# Patient Record
Sex: MALE | Race: WHITE | NOT HISPANIC OR LATINO | Employment: FULL TIME | URBAN - NONMETROPOLITAN AREA
[De-identification: names, ages, dates, MRNs, and addresses within clinical notes are randomized per-mention and may not be internally consistent; named-entity substitution may affect disease eponyms.]

---

## 2024-10-07 ENCOUNTER — TELEPHONE (OUTPATIENT)
Dept: CARDIAC SURGERY | Facility: CLINIC | Age: 39
End: 2024-10-07

## 2024-10-07 PROCEDURE — 87661 TRICHOMONAS VAGINALIS AMPLIF: CPT

## 2024-10-07 PROCEDURE — 87591 N.GONORRHOEAE DNA AMP PROB: CPT

## 2024-10-07 PROCEDURE — G0432 EIA HIV-1/HIV-2 SCREEN: HCPCS

## 2024-10-07 PROCEDURE — 80074 ACUTE HEPATITIS PANEL: CPT

## 2024-10-07 PROCEDURE — 86780 TREPONEMA PALLIDUM: CPT

## 2024-10-07 PROCEDURE — 87491 CHLMYD TRACH DNA AMP PROBE: CPT

## 2024-10-07 NOTE — TELEPHONE ENCOUNTER
Lab called regarding patients labs from 10-7-2024 and states he is reactive to HEP C and states they have never seen a history of that.

## 2024-11-02 ENCOUNTER — HOSPITAL ENCOUNTER (EMERGENCY)
Facility: HOSPITAL | Age: 39
Discharge: HOME | End: 2024-11-02
Admitting: EMERGENCY MEDICINE
Payer: COMMERCIAL

## 2024-11-02 ENCOUNTER — APPOINTMENT (OUTPATIENT)
Dept: ULTRASOUND IMAGING | Facility: HOSPITAL | Age: 39
End: 2024-11-02
Payer: COMMERCIAL

## 2024-11-02 VITALS
DIASTOLIC BLOOD PRESSURE: 66 MMHG | RESPIRATION RATE: 16 BRPM | BODY MASS INDEX: 28.85 KG/M2 | TEMPERATURE: 97.9 F | SYSTOLIC BLOOD PRESSURE: 111 MMHG | HEIGHT: 72 IN | WEIGHT: 213 LBS | HEART RATE: 79 BPM | OXYGEN SATURATION: 99 %

## 2024-11-02 DIAGNOSIS — L02.91 ABSCESS: Primary | ICD-10-CM

## 2024-11-02 LAB
ALBUMIN SERPL-MCNC: 4 G/DL (ref 3.5–5.2)
ALBUMIN/GLOB SERPL: 1.4 G/DL
ALP SERPL-CCNC: 47 U/L (ref 39–117)
ALT SERPL W P-5'-P-CCNC: 33 U/L (ref 1–41)
ANION GAP SERPL CALCULATED.3IONS-SCNC: 10 MMOL/L (ref 5–15)
AST SERPL-CCNC: 28 U/L (ref 1–40)
BASOPHILS # BLD AUTO: 0.03 10*3/MM3 (ref 0–0.2)
BASOPHILS NFR BLD AUTO: 0.2 % (ref 0–1.5)
BILIRUB SERPL-MCNC: 0.5 MG/DL (ref 0–1.2)
BUN SERPL-MCNC: 13 MG/DL (ref 6–20)
BUN/CREAT SERPL: 11.2 (ref 7–25)
CALCIUM SPEC-SCNC: 9.1 MG/DL (ref 8.6–10.5)
CHLORIDE SERPL-SCNC: 103 MMOL/L (ref 98–107)
CO2 SERPL-SCNC: 26 MMOL/L (ref 22–29)
CREAT SERPL-MCNC: 1.16 MG/DL (ref 0.76–1.27)
CRP SERPL-MCNC: 3.28 MG/DL (ref 0–0.5)
D-LACTATE SERPL-SCNC: 1.7 MMOL/L (ref 0.5–2)
DEPRECATED RDW RBC AUTO: 43 FL (ref 37–54)
EGFRCR SERPLBLD CKD-EPI 2021: 82.2 ML/MIN/1.73
EOSINOPHIL # BLD AUTO: 0.03 10*3/MM3 (ref 0–0.4)
EOSINOPHIL NFR BLD AUTO: 0.2 % (ref 0.3–6.2)
ERYTHROCYTE [DISTWIDTH] IN BLOOD BY AUTOMATED COUNT: 13.1 % (ref 12.3–15.4)
GLOBULIN UR ELPH-MCNC: 2.9 GM/DL
GLUCOSE SERPL-MCNC: 102 MG/DL (ref 65–99)
HCT VFR BLD AUTO: 48.7 % (ref 37.5–51)
HGB BLD-MCNC: 16.5 G/DL (ref 13–17.7)
IMM GRANULOCYTES # BLD AUTO: 0.04 10*3/MM3 (ref 0–0.05)
IMM GRANULOCYTES NFR BLD AUTO: 0.3 % (ref 0–0.5)
LYMPHOCYTES # BLD AUTO: 1.49 10*3/MM3 (ref 0.7–3.1)
LYMPHOCYTES NFR BLD AUTO: 11.6 % (ref 19.6–45.3)
MCH RBC QN AUTO: 30.4 PG (ref 26.6–33)
MCHC RBC AUTO-ENTMCNC: 33.9 G/DL (ref 31.5–35.7)
MCV RBC AUTO: 89.9 FL (ref 79–97)
MONOCYTES # BLD AUTO: 1.03 10*3/MM3 (ref 0.1–0.9)
MONOCYTES NFR BLD AUTO: 8 % (ref 5–12)
NEUTROPHILS NFR BLD AUTO: 10.18 10*3/MM3 (ref 1.7–7)
NEUTROPHILS NFR BLD AUTO: 79.7 % (ref 42.7–76)
NRBC BLD AUTO-RTO: 0 /100 WBC (ref 0–0.2)
PLATELET # BLD AUTO: 235 10*3/MM3 (ref 140–450)
PMV BLD AUTO: 9.4 FL (ref 6–12)
POTASSIUM SERPL-SCNC: 4.6 MMOL/L (ref 3.5–5.2)
PROCALCITONIN SERPL-MCNC: 0.04 NG/ML (ref 0–0.25)
PROT SERPL-MCNC: 6.9 G/DL (ref 6–8.5)
RBC # BLD AUTO: 5.42 10*6/MM3 (ref 4.14–5.8)
SODIUM SERPL-SCNC: 139 MMOL/L (ref 136–145)
WBC NRBC COR # BLD AUTO: 12.8 10*3/MM3 (ref 3.4–10.8)

## 2024-11-02 PROCEDURE — 85025 COMPLETE CBC W/AUTO DIFF WBC: CPT | Performed by: NURSE PRACTITIONER

## 2024-11-02 PROCEDURE — 99284 EMERGENCY DEPT VISIT MOD MDM: CPT

## 2024-11-02 PROCEDURE — 86140 C-REACTIVE PROTEIN: CPT | Performed by: NURSE PRACTITIONER

## 2024-11-02 PROCEDURE — 80053 COMPREHEN METABOLIC PANEL: CPT | Performed by: NURSE PRACTITIONER

## 2024-11-02 PROCEDURE — 84145 PROCALCITONIN (PCT): CPT | Performed by: NURSE PRACTITIONER

## 2024-11-02 PROCEDURE — 90471 IMMUNIZATION ADMIN: CPT | Performed by: NURSE PRACTITIONER

## 2024-11-02 PROCEDURE — 90715 TDAP VACCINE 7 YRS/> IM: CPT | Performed by: NURSE PRACTITIONER

## 2024-11-02 PROCEDURE — 25010000002 TETANUS-DIPHTH-ACELL PERTUSSIS 5-2.5-18.5 LF-MCG/0.5 SUSPENSION PREFILLED SYRINGE: Performed by: NURSE PRACTITIONER

## 2024-11-02 PROCEDURE — 83605 ASSAY OF LACTIC ACID: CPT | Performed by: NURSE PRACTITIONER

## 2024-11-02 PROCEDURE — 76999 ECHO EXAMINATION PROCEDURE: CPT

## 2024-11-02 RX ORDER — SODIUM CHLORIDE 0.9 % (FLUSH) 0.9 %
10 SYRINGE (ML) INJECTION AS NEEDED
Status: DISCONTINUED | OUTPATIENT
Start: 2024-11-02 | End: 2024-11-02 | Stop reason: HOSPADM

## 2024-11-02 RX ORDER — CEPHALEXIN 500 MG/1
500 CAPSULE ORAL 3 TIMES DAILY
Qty: 21 CAPSULE | Refills: 0 | Status: SHIPPED | OUTPATIENT
Start: 2024-11-02 | End: 2024-11-07

## 2024-11-02 RX ORDER — SULFAMETHOXAZOLE AND TRIMETHOPRIM 800; 160 MG/1; MG/1
1 TABLET ORAL 2 TIMES DAILY
Qty: 20 TABLET | Refills: 0 | Status: SHIPPED | OUTPATIENT
Start: 2024-11-02 | End: 2024-11-07

## 2024-11-02 RX ADMIN — TETANUS TOXOID, REDUCED DIPHTHERIA TOXOID AND ACELLULAR PERTUSSIS VACCINE, ADSORBED 0.5 ML: 5; 2.5; 8; 8; 2.5 SUSPENSION INTRAMUSCULAR at 10:01

## 2024-11-02 NOTE — DISCHARGE INSTRUCTIONS
Return to ER if symptoms worsen   Warm heat compresses to area three times a day for 15-20 min intervals three times a day  Follow up with primary care provider on Monday   Return to the er before if increased swelling, redness, fever

## 2024-11-02 NOTE — ED PROVIDER NOTES
Subjective   History of Present Illness  Patient is a 39-year-old male presents to the emergency department from urgent care with possible abscess to the left buttocks.  Patient does use IM testosterone and states he gave himself a testosterone injection 3 days ago.  He states the next day that his buttocks became very inflamed indurated and red and painful.  He states this got worse over the last few days.  It is very tender to touch.  And very indurated.  Has had subjective fever as well.  He states the pain has gotten much worse.  No nausea or vomiting.    History provided by:  Patient   used: No        Review of Systems   Constitutional: Negative.    HENT: Negative.     Eyes: Negative.    Respiratory: Negative.     Cardiovascular: Negative.    Gastrointestinal: Negative.    Endocrine: Negative.    Genitourinary: Negative.    Musculoskeletal:         Patient is a 39-year-old male presents to the emergency department from urgent care with possible abscess to the left buttocks.  Patient does use IM testosterone and states he gave himself a testosterone injection 3 days ago.  He states the next day that his buttocks became very inflamed indurated and red and painful.  He states this got worse over the last few days.  It is very tender to touch.  And very indurated.  Has had subjective fever as well.  He states the pain has gotten much worse.  No nausea or vomiting.     Skin: Negative.    Allergic/Immunologic: Negative.    Neurological: Negative.    Hematological: Negative.    Psychiatric/Behavioral: Negative.     All other systems reviewed and are negative.      History reviewed. No pertinent past medical history.    No Known Allergies    Past Surgical History:   Procedure Laterality Date    TONSILLECTOMY         History reviewed. No pertinent family history.    Social History     Socioeconomic History    Marital status: Single   Tobacco Use    Smoking status: Never    Smokeless tobacco: Former  "    Types: Chew   Vaping Use    Vaping status: Never Used   Substance and Sexual Activity    Alcohol use: Never    Drug use: Not Currently    Sexual activity: Defer       Prior to Admission medications    Medication Sig Start Date End Date Taking? Authorizing Provider   ondansetron ODT (ZOFRAN-ODT) 8 MG disintegrating tablet Place 1 tablet on the tongue Every 8 (Eight) Hours As Needed for Nausea or Vomiting. 9/26/24   Aury Juarez APRN   TESTOSTERONE CYPIONATE IM Inject  into the appropriate muscle as directed by prescriber.    Provider, MD Alanna       /78 (BP Location: Left arm, Patient Position: Sitting)   Pulse 77   Temp 97.9 °F (36.6 °C) (Oral)   Resp 20   Ht 182.9 cm (72\")   Wt 96.6 kg (213 lb)   SpO2 98%   BMI 28.89 kg/m²     Objective   Physical Exam  Vitals and nursing note reviewed.   Constitutional:       Appearance: He is well-developed.      Comments: Nontoxic-appearing.  No acute distress.   HENT:      Head: Normocephalic and atraumatic.   Eyes:      Conjunctiva/sclera: Conjunctivae normal.      Pupils: Pupils are equal, round, and reactive to light.   Cardiovascular:      Rate and Rhythm: Normal rate and regular rhythm.      Heart sounds: Normal heart sounds.   Pulmonary:      Effort: Pulmonary effort is normal.      Breath sounds: Normal breath sounds.   Abdominal:      General: Bowel sounds are normal.      Palpations: Abdomen is soft.   Musculoskeletal:      Cervical back: Normal range of motion and neck supple.      Comments: Left buttocks: There is approximately a 5 to 6 cm indurated area noted to the left buttocks.  There is no fluctuant area noted.  Area is mildly erythematous.  Very tender to touch.   Skin:     General: Skin is warm and dry.   Neurological:      Mental Status: He is alert and oriented to person, place, and time.      Deep Tendon Reflexes: Reflexes are normal and symmetric.   Psychiatric:         Behavior: Behavior normal.         Thought Content: Thought " content normal.         Judgment: Judgment normal.         Procedures         Lab Results (last 24 hours)       Procedure Component Value Units Date/Time    Covid-19 + Flu A&B AG, Veritor [[id number]] Collected: 11/02/24 0907    Specimen: Swab Updated: 11/02/24 0908     SARS Antigen Not Detected     Influenza A Antigen MONICA Not Detected     Influenza B Antigen MONICA Not Detected     Internal Control Passed     Lot Number [id number]     Expiration Date 09/04/2025    CBC & Differential [[id number]]  (Abnormal) Collected: 11/02/24 1000    Specimen: Blood Updated: 11/02/24 1009    Narrative:      The following orders were created for panel order CBC & Differential.  Procedure                               Abnormality         Status                     ---------                               -----------         ------                     CBC Auto Differential[[id number]]        Abnormal            Final result                 Please view results for these tests on the individual orders.    Comprehensive Metabolic Panel [[id number]]  (Abnormal) Collected: 11/02/24 1000    Specimen: Blood Updated: 11/02/24 1028     Glucose 102 mg/dL      BUN 13 mg/dL      Creatinine 1.16 mg/dL      Sodium 139 mmol/L      Potassium 4.6 mmol/L      Comment: Slight hemolysis detected by analyzer. Result may be falsely elevated.        Chloride 103 mmol/L      CO2 26.0 mmol/L      Calcium 9.1 mg/dL      Total Protein 6.9 g/dL      Albumin 4.0 g/dL      ALT (SGPT) 33 U/L      AST (SGOT) 28 U/L      Alkaline Phosphatase 47 U/L      Total Bilirubin 0.5 mg/dL      Globulin 2.9 gm/dL      A/G Ratio 1.4 g/dL      BUN/Creatinine Ratio 11.2     Anion Gap 10.0 mmol/L      eGFR 82.2 mL/min/1.73     Narrative:      GFR Normal >60  Chronic Kidney Disease <60  Kidney Failure <15      Lactic Acid, Plasma [[id number]]  (Normal) Collected: 11/02/24 1000    Specimen: Blood Updated: 11/02/24 1025     Lactate 1.7 mmol/L     Procalcitonin [[id number]]  (Normal) Collected:  "11/02/24 1000    Specimen: Blood Updated: 11/02/24 1034     Procalcitonin 0.04 ng/mL     Narrative:      As a Marker for Sepsis (Non-Neonates):    1. <0.5 ng/mL represents a low risk of severe sepsis and/or septic shock.  2. >2 ng/mL represents a high risk of severe sepsis and/or septic shock.    As a Marker for Lower Respiratory Tract Infections that require antibiotic therapy:    PCT on Admission    Antibiotic Therapy       6-12 Hrs later    >0.5                Strongly Recommended  >0.25 - <0.5        Recommended   0.1 - 0.25          Discouraged              Remeasure/reassess PCT  <0.1                Strongly Discouraged     Remeasure/reassess PCT    As 28 day mortality risk marker: \"Change in Procalcitonin Result\" (>80% or <=80%) if Day 0 (or Day 1) and Day 4 values are available. Refer to http://www.liveBooks-pct-calculator.com    Change in PCT <=80%  A decrease of PCT levels below or equal to 80% defines a positive change in PCT test result representing a higher risk for 28-day all-cause mortality of patients diagnosed with severe sepsis for septic shock.    Change in PCT >80%  A decrease of PCT levels of more than 80% defines a negative change in PCT result representing a lower risk for 28-day all-cause mortality of patients diagnosed with severe sepsis or septic shock.       C-reactive Protein [[id number]]  (Abnormal) Collected: 11/02/24 1000    Specimen: Blood Updated: 11/02/24 1028     C-Reactive Protein 3.28 mg/dL     CBC Auto Differential [[id number]]  (Abnormal) Collected: 11/02/24 1000    Specimen: Blood Updated: 11/02/24 1009     WBC 12.80 10*3/mm3      RBC 5.42 10*6/mm3      Hemoglobin 16.5 g/dL      Hematocrit 48.7 %      MCV 89.9 fL      MCH 30.4 pg      MCHC 33.9 g/dL      RDW 13.1 %      RDW-SD 43.0 fl      MPV 9.4 fL      Platelets 235 10*3/mm3      Neutrophil % 79.7 %      Lymphocyte % 11.6 %      Monocyte % 8.0 %      Eosinophil % 0.2 %      Basophil % 0.2 %      Immature Grans % 0.3 %      " Neutrophils, Absolute 10.18 10*3/mm3      Lymphocytes, Absolute 1.49 10*3/mm3      Monocytes, Absolute 1.03 10*3/mm3      Eosinophils, Absolute 0.03 10*3/mm3      Basophils, Absolute 0.03 10*3/mm3      Immature Grans, Absolute 0.04 10*3/mm3      nRBC 0.0 /100 WBC             US Soft Tissue   Final Result   FINDINGS/IMPRESSION:       Targeted soft tissue ultrasound of the left buttock/gluteal soft tissues   over an area of clinical concern was performed. At this site, there is a   1.6 x 0.6 x 0.3 cm fluid collection in the subcutaneous fat.   Differential includes a small abscess versus a small soft tissue   hematoma. There is some surrounding hypervascularity and soft tissue   edema present.       This report was signed and finalized on 11/2/2024 10:28 AM by Dr. Ferdinand Colorado MD.              ED Course  ED Course as of 11/02/24 1100   Sat Nov 02, 2024   0955 Reviewed pt and pt care plan with Dr. Lopez. In agreement with care plan  [CW]   1053 Reviewed pt and pt care plan with Dr. Lopez- will place on Keflex and bactrim. Advised to apply heat to area. Advised to follow up with pcp on Monday if no improvement, return before if symptoms worsen. Pt in agreement with care plan and voices understanding of instructions  [CW]   1056 Targeted soft tissue ultrasound of the left buttock/gluteal soft tissues  over an area of clinical concern was performed. At this site, there is a  1.6 x 0.6 x 0.3 cm fluid collection in the subcutaneous fat.  Differential includes a small abscess versus a small soft tissue  hematoma. There is some surrounding hypervascularity and soft tissue  edema present.      [CW]   1056 Labartory studies Pro-Yayo negative CRP is 3.08 lactate is negative CMP is negative CBC shows white count of 12.8 ultrasound shows a 1.6 x 0.6 x 0.3 cm fluid collection in the subcu fat differential includes a small abscess versus a small soft tissue hematoma.  Reviewed with Dr. Lopez.  Will place patient on  Bactrim and Keflex.  Advised moist heat to the area.  He was updated on his tetanus.  Advised to follow-up with his primary care doctor on Monday.  Will place small portal Tuesday.  Advised to return to emergency department before if increased swelling, redness, fever.  Patient in agreement with care plan and voices understanding of instruction.  Patient will be discharged shortly in stable condition. [CW]      ED Course User Index  [CW] Doreen Saldana APRN        Medical Decision Making  Patient is a 39-year-old male presents to the emergency department from urgent care with possible abscess to the left buttocks.  Patient does use IM testosterone and states he gave himself a testosterone injection 3 days ago.  He states the next day that his buttocks became very inflamed indurated and red and painful.  He states this got worse over the last few days.  It is very tender to touch.  And very indurated.  Has had subjective fever as well.  He states the pain has gotten much worse.  No nausea or vomiting.  Course of treatment in the ED: Nontoxic-appearing.  No acute distress.  Vitals are stable with a blood pressure 125/78, temp 97.9, heart rate 77, respirations 20, O2 sat 98% on room air.  Lungs clear to auscultation.  CV normal sinus rhythm.  Right buttocks there is approximately a 5-1/2 to 6 cm indurated erythematous area noted to the mid right buttocks.  There is no fluctuant abscess noted.  This area is firm and there is no fluctuant abscess.  Erythematous.  Very tender to touch.  Ultrasound of the area, laboratory studies have been ordered. Reviewed pt and pt care plan with Dr. Lopez- also in agreement with care plan and in agreement with care plan     Problems Addressed:  Abscess: complicated acute illness or injury    Amount and/or Complexity of Data Reviewed  Labs: ordered. Decision-making details documented in ED Course.  Radiology: ordered. Decision-making details documented in ED  Course.    Risk  Prescription drug management.         Final diagnoses:   Abscess          Doreen Saldana, APRN  11/02/24 1100

## 2024-11-02 NOTE — Clinical Note
[facility] EMERGENCY DEPARTMENT  [address]  [facility] [address]  Phone: [phone]    Jose Ortiz was seen and treated in our emergency department on 11/2/2024.  He may return to work on 11/05/2024.         Thank you for choosing [facility].    Doreen Saldana APRN

## 2024-11-07 ENCOUNTER — OFFICE VISIT (OUTPATIENT)
Dept: FAMILY MEDICINE CLINIC | Facility: CLINIC | Age: 39
End: 2024-11-07
Payer: COMMERCIAL

## 2024-11-07 ENCOUNTER — LAB (OUTPATIENT)
Dept: LAB | Facility: HOSPITAL | Age: 39
End: 2024-11-07
Payer: COMMERCIAL

## 2024-11-07 ENCOUNTER — HOSPITAL ENCOUNTER (OUTPATIENT)
Dept: ULTRASOUND IMAGING | Facility: HOSPITAL | Age: 39
Discharge: HOME | End: 2024-11-07
Payer: COMMERCIAL

## 2024-11-07 VITALS
TEMPERATURE: 98.3 F | DIASTOLIC BLOOD PRESSURE: 94 MMHG | RESPIRATION RATE: 15 BRPM | HEIGHT: 72 IN | BODY MASS INDEX: 29.66 KG/M2 | HEART RATE: 71 BPM | OXYGEN SATURATION: 97 % | SYSTOLIC BLOOD PRESSURE: 112 MMHG | WEIGHT: 219 LBS

## 2024-11-07 DIAGNOSIS — R53.83 OTHER FATIGUE: ICD-10-CM

## 2024-11-07 DIAGNOSIS — Z00.00 ENCOUNTER FOR MEDICAL EXAMINATION TO ESTABLISH CARE: Primary | ICD-10-CM

## 2024-11-07 DIAGNOSIS — Z12.5 SCREENING FOR PROSTATE CANCER: ICD-10-CM

## 2024-11-07 DIAGNOSIS — Z87.438 HISTORY OF TESTICULAR MASS: ICD-10-CM

## 2024-11-07 DIAGNOSIS — Z86.19 HISTORY OF HEPATITIS C: ICD-10-CM

## 2024-11-07 DIAGNOSIS — N50.3 EPIDIDYMAL CYST: Primary | ICD-10-CM

## 2024-11-07 DIAGNOSIS — M43.10 RETROLISTHESIS OF VERTEBRAE: ICD-10-CM

## 2024-11-07 LAB
ALBUMIN UR-MCNC: <1.2 MG/DL
CHOLEST SERPL-MCNC: 127 MG/DL (ref 130–200)
CREAT UR-MCNC: 109.9 MG/DL
HBA1C MFR BLD: 5.4 % (ref 4.8–5.9)
HDLC SERPL-MCNC: 24 MG/DL
LDLC SERPL CALC-MCNC: 83 MG/DL (ref 0–99)
LDLC/HDLC SERPL: 3.43 {RATIO}
MICROALBUMIN/CREAT UR: NORMAL MG/G{CREAT}
PSA SERPL-MCNC: 1.11 NG/ML (ref 0–4)
TRIGL SERPL-MCNC: 104 MG/DL (ref 0–149)
TSH SERPL DL<=0.05 MIU/L-ACNC: 1.62 UIU/ML (ref 0.27–4.2)
VLDLC SERPL-MCNC: 20 MG/DL (ref 5–40)

## 2024-11-07 PROCEDURE — 80061 LIPID PANEL: CPT

## 2024-11-07 PROCEDURE — 36415 COLL VENOUS BLD VENIPUNCTURE: CPT

## 2024-11-07 PROCEDURE — 99214 OFFICE O/P EST MOD 30 MIN: CPT | Performed by: STUDENT IN AN ORGANIZED HEALTH CARE EDUCATION/TRAINING PROGRAM

## 2024-11-07 PROCEDURE — 84443 ASSAY THYROID STIM HORMONE: CPT

## 2024-11-07 PROCEDURE — 87522 HEPATITIS C REVRS TRNSCRPJ: CPT

## 2024-11-07 PROCEDURE — 84402 ASSAY OF FREE TESTOSTERONE: CPT

## 2024-11-07 PROCEDURE — 83036 HEMOGLOBIN GLYCOSYLATED A1C: CPT

## 2024-11-07 PROCEDURE — 82570 ASSAY OF URINE CREATININE: CPT

## 2024-11-07 PROCEDURE — 84403 ASSAY OF TOTAL TESTOSTERONE: CPT

## 2024-11-07 PROCEDURE — 76870 US EXAM SCROTUM: CPT

## 2024-11-07 PROCEDURE — 82043 UR ALBUMIN QUANTITATIVE: CPT

## 2024-11-07 PROCEDURE — G0103 PSA SCREENING: HCPCS

## 2024-11-07 RX ORDER — CYCLOBENZAPRINE HCL 10 MG
10 TABLET ORAL 3 TIMES DAILY PRN
Qty: 90 TABLET | Refills: 2 | Status: SHIPPED | OUTPATIENT
Start: 2024-11-07

## 2024-11-07 RX ORDER — LIDOCAINE 50 MG/G
1 PATCH TOPICAL EVERY 24 HOURS
Qty: 30 PATCH | Refills: 4 | Status: SHIPPED | OUTPATIENT
Start: 2024-11-07
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
## 2024-11-10 LAB
TESTOST FREE SERPL-MCNC: >50 PG/ML (ref 8.7–25.1)
TESTOST SERPL-MCNC: >1500 NG/DL (ref 264–916)

## 2024-11-11 LAB
HCV RNA SERPL NAA+PROBE-ACNC: NORMAL IU/ML
REF LAB TEST REF RANGE: NORMAL

## 2025-01-14 ENCOUNTER — OFFICE VISIT (OUTPATIENT)
Dept: FAMILY MEDICINE CLINIC | Facility: CLINIC | Age: 40
End: 2025-01-14
Payer: COMMERCIAL

## 2025-01-14 VITALS
HEART RATE: 98 BPM | DIASTOLIC BLOOD PRESSURE: 106 MMHG | TEMPERATURE: 97.9 F | RESPIRATION RATE: 14 BRPM | SYSTOLIC BLOOD PRESSURE: 142 MMHG | OXYGEN SATURATION: 97 % | BODY MASS INDEX: 30.34 KG/M2 | HEIGHT: 72 IN | WEIGHT: 224 LBS

## 2025-01-14 DIAGNOSIS — B00.1 RECURRENT COLD SORES: ICD-10-CM

## 2025-01-14 DIAGNOSIS — T38.7X5S ADVERSE EFFECT OF TESTOSTERONE, SEQUELA: ICD-10-CM

## 2025-01-14 DIAGNOSIS — R03.0 ELEVATED BLOOD PRESSURE READING: Primary | ICD-10-CM

## 2025-01-14 DIAGNOSIS — N50.3 EPIDIDYMAL CYST: ICD-10-CM

## 2025-01-14 PROCEDURE — 99214 OFFICE O/P EST MOD 30 MIN: CPT | Performed by: STUDENT IN AN ORGANIZED HEALTH CARE EDUCATION/TRAINING PROGRAM

## 2025-01-14 RX ORDER — VALACYCLOVIR HYDROCHLORIDE 1 G/1
1000 TABLET, FILM COATED ORAL 3 TIMES DAILY
Qty: 30 TABLET | Refills: 0 | Status: SHIPPED | OUTPATIENT
Start: 2025-01-14 | End: 2025-01-24

## 2025-01-20 PROBLEM — B00.1 RECURRENT COLD SORES: Status: ACTIVE | Noted: 2025-01-20

## 2025-01-20 NOTE — PROGRESS NOTES
Chief Complaint  lab results    Baljeet Ortiz presents to [facility] PRIMARY CARE    HPI    History of Present Illness  The patient presents for evaluation of elevated blood pressure, fever blisters, and testicular cyst.    He has been experiencing persistent elevated blood pressure, which he attributes to significant stress levels. He does not currently possess a home blood pressure monitor. He reports an episode of tachycardia while at rest, which was accompanied by transient visual disturbances and a sensation akin to a mild stroke. These symptoms resolved spontaneously. He also experiences occasional lightheadedness, particularly during periods of heightened stress.    He has a history of fever blisters, which have been effectively managed with valacyclovir previously. He recently experienced two consecutive episodes of fever blisters, which he believes were triggered by exposure to a tanning bed. He also identifies acidic foods and stress as potential triggers. He expresses interest in resuming valacyclovir treatment to prevent future outbreaks, given his frequent use of tanning beds.    He has a testicular cyst that causes sharp pain upon contact. However, it does not interfere with his daily activities. He is considering surgical removal of the cyst due to its nuisance value.    He has been on a self-prescribed testosterone regimen, currently at a reduced dose of 250 to 300 mg per week, down from his usual 900 mg per week. Labs from 11/7 show testosterone level >1500    Supplemental Information  He has been consuming antacid tablets recently.    MEDICATIONS  - Testosterone  - Valacyclovir    Past Medical History:   Diagnosis Date    Low testosterone      Past Surgical History:   Procedure Laterality Date    TONSILLECTOMY       Social History     Socioeconomic History    Marital status: Single   Tobacco Use    Smoking status: Never    Smokeless tobacco: Former      "Types: Chew   Vaping Use    Vaping status: Never Used   Substance and Sexual Activity    Alcohol use: Never    Drug use: Not Currently    Sexual activity: Yes     Partners: Female       Objective   Vital Signs:  BP (!) 142/106   Pulse 98   Temp 97.9 °F (36.6 °C) (Temporal)   Resp 14   Ht 182.9 cm (72.01\")   Wt 102 kg (224 lb)   SpO2 97%   BMI 30.37 kg/m²   Estimated body mass index is 30.37 kg/m² as calculated from the following:    Height as of this encounter: 182.9 cm (72.01\").    Weight as of this encounter: 102 kg (224 lb).              Physical Exam  Vitals reviewed.   Constitutional:       Appearance: Normal appearance.   HENT:      Head: Normocephalic.      Nose: Nose normal.      Mouth/Throat:      Mouth: Mucous membranes are moist.   Eyes:      Extraocular Movements: Extraocular movements intact.   Cardiovascular:      Rate and Rhythm: Normal rate and regular rhythm.      Heart sounds: Normal heart sounds.   Pulmonary:      Effort: Pulmonary effort is normal.      Breath sounds: Normal breath sounds.   Musculoskeletal:         General: Normal range of motion.      Cervical back: Normal range of motion.   Skin:     General: Skin is warm and dry.   Neurological:      General: No focal deficit present.      Mental Status: He is alert and oriented to person, place, and time.   Psychiatric:         Mood and Affect: Mood normal.         Behavior: Behavior normal.        Physical Exam  Vital Signs  Blood pressure is 142/106.    Result Review :        Results  Laboratory Studies  Testosterone level greater than 1500. Hepatitis not detected. PSA is normal. LDL is normal, HDL is a little low. A1c is 5.4. Thyroid is normal. No protein leaking of the urine.             Assessment and Plan   Diagnoses and all orders for this visit:    1. Elevated blood pressure reading (Primary)  -His elevated blood pressure may be attributed to his testosterone therapy, stress levels, or a combination of both. The cessation of " testosterone therapy will help determine if it is a contributing factor. He has been advised to discontinue his testosterone therapy for a period of 3 to 4 weeks and monitor his blood pressure during this time. He has been counseled on the importance of maintaining a low-sodium diet. If his blood pressure remains elevated after the cessation of testosterone therapy, antihypertensive medication will be considered.    2. Recurrent cold sores  -He has been advised to either commence daily valacyclovir therapy or initiate treatment at the onset of symptoms and continue for the full course. A prescription for valacyclovir 1 g, to be taken three times daily for 10 days, has been provided. If episodic treatment is not effective, suppressive therapy may be considered.  -     valACYclovir (Valtrex) 1000 MG tablet; Take 1 tablet by mouth 3 (Three) Times a Day for 10 days.  Dispense: 30 tablet; Refill: 0    3. Epididymal cyst  -The cyst is approximately 7 mm in size, likely benign, and does not exhibit characteristics suggestive of malignancy. He has been informed that surgical intervention is not necessary unless the cyst becomes symptomatic. If symptoms worsen, a referral to urology will be considered.    4. Adverse effect of testosterone, sequela  -He is on a self-prescribed testosterone regimen, currently at a reduced dose of 250 to 300 mg per week, down from his usual 900 mg per week. His testosterone level was significantly high at greater than 1500 in November. He has been advised to discontinue his testosterone therapy for a period of 3 to 4 weeks to assess its impact on his blood pressure. If his blood pressure normalizes, testosterone therapy may be resumed at a lower dose with regular monitoring.           EMR Dragon/Transcription disclaimer:   Much of this encounter note is an electronic transcription/translation of spoken language to printed text. The electronic translation of spoken language may permit erroneous,  or at times, nonsensical words or phrases to be inadvertently transcribed; although attempts have made to review the note for such errors, some may still exist. Please excuse any unrecognized transcription errors and contact us if the air is unintelligible or needs documented correction. Also, portions of this note have been copied forward, however, changed to reflect the most current clinical status of this patient.  Follow Up   Return in about 4 weeks (around 2/11/2025).    Patient or patient representative verbalized consent for the use of Ambient Listening during the visit with  Mert Ochoa MD for chart documentation. 1/20/2025  12:27 CST    Patient was given instructions and counseling regarding his condition or for health maintenance advice. Please see specific information pulled into the AVS if appropriate.

## 2025-03-05 ENCOUNTER — OFFICE VISIT (OUTPATIENT)
Dept: FAMILY MEDICINE CLINIC | Facility: CLINIC | Age: 40
End: 2025-03-05
Payer: COMMERCIAL

## 2025-03-05 VITALS
SYSTOLIC BLOOD PRESSURE: 112 MMHG | TEMPERATURE: 98 F | RESPIRATION RATE: 14 BRPM | WEIGHT: 121 LBS | DIASTOLIC BLOOD PRESSURE: 70 MMHG | HEIGHT: 72 IN | OXYGEN SATURATION: 97 % | HEART RATE: 76 BPM | BODY MASS INDEX: 16.39 KG/M2

## 2025-03-05 DIAGNOSIS — F41.9 ANXIETY AND DEPRESSION: ICD-10-CM

## 2025-03-05 DIAGNOSIS — R44.0 AUDITORY HALLUCINATIONS: Primary | ICD-10-CM

## 2025-03-05 DIAGNOSIS — F32.A ANXIETY AND DEPRESSION: ICD-10-CM

## 2025-03-05 DIAGNOSIS — R03.0 ELEVATED BLOOD PRESSURE READING: ICD-10-CM

## 2025-03-05 PROCEDURE — 99215 OFFICE O/P EST HI 40 MIN: CPT | Performed by: STUDENT IN AN ORGANIZED HEALTH CARE EDUCATION/TRAINING PROGRAM

## 2025-03-05 RX ORDER — HYDROXYZINE HYDROCHLORIDE 25 MG/1
25 TABLET, FILM COATED ORAL 3 TIMES DAILY PRN
Qty: 90 TABLET | Refills: 2 | Status: SHIPPED | OUTPATIENT
Start: 2025-03-05

## 2025-03-05 NOTE — PROGRESS NOTES
Chief Complaint  hearing voices, Anxiety, and Depression    Subjective        Jose Ortiz presents to [facility]    HPI    History of Present Illness  The patient presents for evaluation of auditory hallucinations, anxiety, depression, and hypertension.    He has been experiencing auditory hallucinations since 2017, which have significantly contributed to his severe anxiety and depression. These hallucinations are intermittent, characterized by a humming or ringing sound that alternates between his ears. He reports engaging in full conversations with these voices, which he perceives as different individuals. These voices often encourage him to engage in self-destructive behaviors such as suicide or drug use. He also experiences intrusive thoughts and images, including hanging himself, cutting his wrists, or shooting himself. He describes these images as being projected onto a screen in his mind, with the voices providing a detailed narration. He has a history of methamphetamine use, starting at age 16, but has been clean for over 3 years. He does not believe his current symptoms are related to his past drug use. He served in the  from 2003 to 2007 but was discharged due to drug use. He suspects that his family and the  may be involved in his current situation. He has a scheduled appointment with a psychiatrist in [place] on Friday. He is currently employed as a  at [employer]. He underwent a tonsillectomy and had ear tubes inserted at the age of 10.    He has been experiencing severe anxiety and depression, which he attributes to the stress of his auditory hallucinations. He has a history of suicidal ideation and a suicide attempt approximately 5 days ago. He is currently in recovery from substance abuse and has been sober for over 3 years. He reports significant stress at work and home, which he believes is exacerbated by his living situation.  "He shares a residence with two coworkers, but the environment is not conducive to his mental health. He often isolates himself in his room to avoid social interaction. He is currently working and saving money to pay off his taxes and other financial obligations. He maintains a routine of going to the gym after work and retiring to bed early. He is seeking medication to help manage his anxiety and prevent further self-harm. He has previously found Vistaril effective for his anxiety.    He believes his persistent hypertension is a result of the stress and anxiety induced by these hallucinations.    He had stopped taking testosterone but was having some problems with erectile dysfunction, so he started back up on a low dose of testosterone at 250 mg a week.    SOCIAL HISTORY  He has been clean and sober for over 3 years. He lives with his coworkers. He served in the  from 2003 to 2007 but was discharged due to drug use. He is currently employed as a  at [employer].    MEDICATIONS  - Current: Testosterone  - Past: Vistaril          Past Medical History:   Diagnosis Date    Low testosterone      Past Surgical History:   Procedure Laterality Date    TONSILLECTOMY       Social History     Socioeconomic History    Marital status: Single   Tobacco Use    Smoking status: Never    Smokeless tobacco: Former     Types: Chew   Vaping Use    Vaping status: Never Used   Substance and Sexual Activity    Alcohol use: Never    Drug use: Not Currently    Sexual activity: Yes     Partners: Female       Objective   Vital Signs:  /70   Pulse 76   Temp 98 °F (36.7 °C) (Temporal)   Resp 14   Ht 182.9 cm (72.01\")   Wt 54.9 kg (121 lb)   SpO2 97%   BMI 16.41 kg/m²   Estimated body mass index is 16.41 kg/m² as calculated from the following:    Height as of this encounter: 182.9 cm (72.01\").    Weight as of this encounter: 54.9 kg (121 lb).       BMI is below normal parameters (malnutrition). Recommendations: none " (medical contraindication)      Physical Exam  Vitals reviewed.   Constitutional:       Appearance: Normal appearance.   HENT:      Head: Normocephalic.      Nose: Nose normal.      Mouth/Throat:      Mouth: Mucous membranes are moist.   Eyes:      Extraocular Movements: Extraocular movements intact.   Cardiovascular:      Rate and Rhythm: Normal rate and regular rhythm.      Heart sounds: Normal heart sounds.   Pulmonary:      Effort: Pulmonary effort is normal.      Breath sounds: Normal breath sounds.   Musculoskeletal:         General: Normal range of motion.      Cervical back: Normal range of motion.   Skin:     General: Skin is warm and dry.   Neurological:      General: No focal deficit present.      Mental Status: He is alert and oriented to person, place, and time.   Psychiatric:         Mood and Affect: Mood normal.         Behavior: Behavior normal.        Physical Exam      Result Review :        Results               Assessment and Plan   Diagnoses and all orders for this visit:    1. Auditory hallucinations (Primary)  -The patient reports hearing voices since 2017, which have caused severe anxiety and depression. He describes the voices as intrusive and negative, sometimes urging self-harm and drug use. The etiology could be multifactorial, potentially linked to schizophrenia, bipolar disorder, or residual effects of past substance abuse. Samples of Caplyta will be provided, starting with a lower dose 10.5 mg daily for one week, then gradually increasing to 21 mg then 42 mg over 4 weeks. He is advised to take the medication in the evening to help with sleep and reduce anxiety. A referral to a psychiatrist for further evaluation and management is recommended. He is also encouraged to seek help from [facility] for additional support and treatment planning.    2. Anxiety and depression  -The patient's anxiety is exacerbated by his auditory hallucinations and stressful living environment. Caplyta is  expected to help reduce anxiety levels. Additionally, a prescription for Vistaril will be sent to [facility], which he can take as needed for anxiety relief. He is advised to continue his routine of going to the gym and maintaining a structured daily schedule to help manage anxiety.The patient reports severe depression, including suicidal thoughts and a past suicide attempt. Caplyta is expected to help improve mood and reduce depressive symptoms. He is advised to seek immediate help if suicidal thoughts intensify. A follow-up with a psychiatrist is recommended for ongoing management of his depression.    -     hydrOXYzine (ATARAX) 25 MG tablet; Take 1 tablet by mouth 3 (Three) Times a Day As Needed for Itching.  Dispense: 90 tablet; Refill: 2    3. Elevated blood pressure reading  -The patient's blood pressure has improved, likely due to reduced anxiety and cessation of high-dose testosterone. He is advised to discontinue testosterone therapy temporarily to focus on mental health stabilization. Blood pressure will be monitored, and testosterone levels will be rechecked once mental health is stabilize    Follow-up 4 weeks     I spent 44 minutes caring for Jose on this date of service. This time includes time spent by me in the following activities:preparing for the visit, reviewing tests, obtaining and/or reviewing a separately obtained history, performing a medically appropriate examination and/or evaluation , counseling and educating the patient/family/caregiver, ordering medications, tests, or procedures, referring and communicating with other health care professionals , documenting information in the medical record, independently interpreting results and communicating that information with the patient/family/caregiver, and care coordination  EMR Dragon/Transcription disclaimer:   Part of this note may be an electronic transcription/translation of spoken language to printed text using the Dragon Dictation  System     Follow Up   No follow-ups on file.    Patient or patient representative verbalized consent for the use of Ambient Listening during the visit with  Mret Ochoa MD for chart documentation. 3/10/2025  13:56 CDT    Patient was given instructions and counseling regarding his condition or for health maintenance advice. Please see specific information pulled into the AVS if appropriate.

## 2025-04-04 ENCOUNTER — OFFICE VISIT (OUTPATIENT)
Dept: FAMILY MEDICINE CLINIC | Facility: CLINIC | Age: 40
End: 2025-04-04
Payer: COMMERCIAL

## 2025-04-04 VITALS
HEIGHT: 72 IN | TEMPERATURE: 98 F | DIASTOLIC BLOOD PRESSURE: 80 MMHG | HEART RATE: 86 BPM | OXYGEN SATURATION: 99 % | SYSTOLIC BLOOD PRESSURE: 128 MMHG | RESPIRATION RATE: 12 BRPM | WEIGHT: 124 LBS | BODY MASS INDEX: 16.8 KG/M2

## 2025-04-04 DIAGNOSIS — F41.9 ANXIETY AND DEPRESSION: ICD-10-CM

## 2025-04-04 DIAGNOSIS — R44.0 AUDITORY HALLUCINATIONS: Primary | ICD-10-CM

## 2025-04-04 DIAGNOSIS — F32.A ANXIETY AND DEPRESSION: ICD-10-CM

## 2025-04-04 DIAGNOSIS — M43.10 RETROLISTHESIS OF VERTEBRAE: ICD-10-CM

## 2025-04-04 RX ORDER — LUMATEPERONE 42 MG/1
1 CAPSULE ORAL DAILY
Qty: 90 CAPSULE | Refills: 3 | Status: SHIPPED | OUTPATIENT
Start: 2025-04-04

## 2025-04-04 RX ORDER — BUSPIRONE HYDROCHLORIDE 7.5 MG/1
7.5 TABLET ORAL 2 TIMES DAILY
Qty: 60 TABLET | Refills: 2 | Status: SHIPPED | OUTPATIENT
Start: 2025-04-04

## 2025-04-04 RX ORDER — GABAPENTIN 800 MG/1
800 TABLET ORAL 3 TIMES DAILY PRN
Qty: 90 TABLET | Refills: 2 | Status: SHIPPED | OUTPATIENT
Start: 2025-04-04

## 2025-04-04 NOTE — PROGRESS NOTES
Chief Complaint  medication check (Cymbalta is working well )    Subjective        Jose Ortiz presents to [facility]    HPI    History of Present Illness  The patient presents for evaluation of anxiety and back pain.    He reports a positive response to Caplyta, which he has been utilizing as a sample medication.  Over 4 weeks he worked up to 42 mg dose and tolerating well.  However, he has exhausted his supply and is seeking a prescription. He notes that the medication effectively induces sleep when taken around 9:00 PM or 10:00 PM, and its effects persist into the morning hours. Initially, he did not perceive the medication's efficacy, but after a loading period of 3 to 4 weeks, he observed significant improvement. He continues to experience auditory hallucinations but reports an enhanced ability to manage them. He has also sought counseling services for.    He experiences severe panic attacks during the day, which are not adequately managed by Vistaril. He is interested in exploring alternative treatment options. His anxiety is exacerbated by stressful situations, leading to heightened nervousness and jitteriness.    He has a history of back injury and has been scheduled for pain management on 04/23/2025. He was previously prescribed gabapentin for sciatic nerve pain and back pain, which he found beneficial. The dosage was initially low and gradually increased to 800 mg, administered 2 to 3 times daily.    MEDICATIONS  - Current: Caplyta  - Current: Vistaril  - Past: gabapentin    Past Medical History:   Diagnosis Date    Low testosterone      Past Surgical History:   Procedure Laterality Date    TONSILLECTOMY       Social History     Socioeconomic History    Marital status: Single   Tobacco Use    Smoking status: Never    Smokeless tobacco: Former     Types: Chew   Vaping Use    Vaping status: Never Used   Substance and Sexual Activity    Alcohol use: Never    Drug  "use: Not Currently    Sexual activity: Yes     Partners: Female       Objective   Vital Signs:  /80   Pulse 86   Temp 98 °F (36.7 °C) (Temporal)   Resp 12   Ht 182.9 cm (72.01\")   Wt 56.2 kg (124 lb)   SpO2 99%   BMI 16.81 kg/m²   Estimated body mass index is 16.81 kg/m² as calculated from the following:    Height as of this encounter: 182.9 cm (72.01\").    Weight as of this encounter: 56.2 kg (124 lb).              Physical Exam   Physical Exam  Vital Signs  Blood pressure is normal.    Result Review :        Results               Assessment and Plan   Diagnoses and all orders for this visit:    1. Auditory hallucinations (Primary)  -Improved. Continue counseling with [facility]. A prescription for Caplyta 42 mg has been issued, and he will continue with this medication. He is instructed to inform us if there are any issues with insurance coverage for Caplyta.  -     Lumateperone Tosylate (Caplyta) 42 MG capsule; Take 1 capsule by mouth Daily.  Dispense: 90 capsule; Refill: 3    2. Anxiety and depression  -He reports that Vistaril is not effectively managing his daytime panic attacks. BuSpar (buspirone) 10 mg twice daily has been recommended, with the option to increase to three times daily if needed. He is advised to take it 30-45 minutes before anticipated anxiety-inducing situations if taking as-needed. Can increase dose if tolerated. Propranolol was discussed as an alternative option for anxiety management.  -     busPIRone (BUSPAR) 7.5 MG tablet; Take 1 tablet by mouth 2 (Two) Times a Day.  Dispense: 60 tablet; Refill: 2    3. Retrolisthesis of vertebrae  -     gabapentin (Neurontin) 800 MG tablet; Take 1 tablet by mouth 3 (Three) Times a Day As Needed (sciatic pain).  Dispense: 90 tablet; Refill: 2      FU 3 mo or sooner if needed       EMR Dragon/Transcription disclaimer:   Part of this note may be an electronic transcription/translation of spoken language to printed text using the Dragon Dictation " System     Follow Up   Return in about 3 months (around 7/4/2025).    Patient or patient representative verbalized consent for the use of Ambient Listening during the visit with  Mert Ochoa MD for chart documentation. 4/4/2025  09:29 CDT    Patient was given instructions and counseling regarding his condition or for health maintenance advice. Please see specific information pulled into the AVS if appropriate.

## 2025-04-25 ENCOUNTER — TRANSCRIBE ORDERS (OUTPATIENT)
Dept: ADMINISTRATIVE | Facility: HOSPITAL | Age: 40
End: 2025-04-25
Payer: COMMERCIAL

## 2025-04-25 DIAGNOSIS — M54.16 LUMBAR RADICULOPATHY: Primary | ICD-10-CM

## 2025-04-26 ENCOUNTER — TRANSCRIBE ORDERS (OUTPATIENT)
Dept: ADMINISTRATIVE | Facility: HOSPITAL | Age: 40
End: 2025-04-26
Payer: COMMERCIAL

## 2025-04-26 DIAGNOSIS — M43.06 SPONDYLOLYSIS, LUMBAR REGION: ICD-10-CM

## 2025-04-26 DIAGNOSIS — M47.816 SPONDYLOSIS WITHOUT MYELOPATHY OR RADICULOPATHY, LUMBAR REGION: ICD-10-CM

## 2025-04-26 DIAGNOSIS — M54.50 LOW BACK PAIN, UNSPECIFIED BACK PAIN LATERALITY, UNSPECIFIED CHRONICITY, UNSPECIFIED WHETHER SCIATICA PRESENT: ICD-10-CM

## 2025-04-26 DIAGNOSIS — M54.17 RADICULOPATHY, LUMBOSACRAL REGION: ICD-10-CM

## 2025-04-26 DIAGNOSIS — M54.16 RADICULOPATHY, LUMBAR REGION: Primary | ICD-10-CM

## 2025-04-26 DIAGNOSIS — M99.23 SUBLUXATION STENOSIS OF NEURAL CANAL OF LUMBAR REGION: ICD-10-CM

## 2025-04-30 PROBLEM — J02.9 PHARYNGITIS: Status: ACTIVE | Noted: 2025-04-30

## 2025-04-30 PROBLEM — R11.0 NAUSEA: Status: ACTIVE | Noted: 2025-04-30

## 2025-04-30 PROBLEM — R05.8 PRODUCTIVE COUGH: Status: ACTIVE | Noted: 2025-04-30

## 2025-04-30 PROBLEM — J01.40 ACUTE NON-RECURRENT PANSINUSITIS: Status: ACTIVE | Noted: 2025-04-30

## 2025-04-30 PROBLEM — R50.9 FEVER IN ADULT: Status: ACTIVE | Noted: 2025-04-30

## 2025-05-06 ENCOUNTER — HOSPITAL ENCOUNTER (OUTPATIENT)
Dept: GENERAL RADIOLOGY | Facility: HOSPITAL | Age: 40
Discharge: HOME | End: 2025-05-06
Payer: COMMERCIAL

## 2025-05-06 ENCOUNTER — HOSPITAL ENCOUNTER (OUTPATIENT)
Dept: MRI IMAGING | Facility: HOSPITAL | Age: 40
Discharge: HOME | End: 2025-05-06
Payer: COMMERCIAL

## 2025-05-06 DIAGNOSIS — M47.816 SPONDYLOSIS WITHOUT MYELOPATHY OR RADICULOPATHY, LUMBAR REGION: ICD-10-CM

## 2025-05-06 DIAGNOSIS — M54.16 LUMBAR RADICULOPATHY: ICD-10-CM

## 2025-05-06 DIAGNOSIS — M54.16 RADICULOPATHY, LUMBAR REGION: ICD-10-CM

## 2025-05-06 DIAGNOSIS — M54.50 LOW BACK PAIN, UNSPECIFIED BACK PAIN LATERALITY, UNSPECIFIED CHRONICITY, UNSPECIFIED WHETHER SCIATICA PRESENT: ICD-10-CM

## 2025-05-06 DIAGNOSIS — M99.23 SUBLUXATION STENOSIS OF NEURAL CANAL OF LUMBAR REGION: ICD-10-CM

## 2025-05-06 DIAGNOSIS — M43.06 SPONDYLOLYSIS, LUMBAR REGION: ICD-10-CM

## 2025-05-06 DIAGNOSIS — M54.17 RADICULOPATHY, LUMBOSACRAL REGION: ICD-10-CM

## 2025-05-06 PROCEDURE — 72120 X-RAY BEND ONLY L-S SPINE: CPT

## 2025-05-06 PROCEDURE — 72148 MRI LUMBAR SPINE W/O DYE: CPT

## 2025-06-04 DIAGNOSIS — F41.9 ANXIETY AND DEPRESSION: ICD-10-CM

## 2025-06-04 DIAGNOSIS — F32.A ANXIETY AND DEPRESSION: ICD-10-CM

## 2025-06-04 RX ORDER — HYDROXYZINE HYDROCHLORIDE 25 MG/1
25 TABLET, FILM COATED ORAL 3 TIMES DAILY PRN
Qty: 90 TABLET | Refills: 0 | Status: SHIPPED | OUTPATIENT
Start: 2025-06-04

## 2025-07-19 DIAGNOSIS — F41.9 ANXIETY AND DEPRESSION: ICD-10-CM

## 2025-07-19 DIAGNOSIS — F32.A ANXIETY AND DEPRESSION: ICD-10-CM

## 2025-07-23 RX ORDER — BUSPIRONE HYDROCHLORIDE 7.5 MG/1
7.5 TABLET ORAL 2 TIMES DAILY
Qty: 60 TABLET | Refills: 0 | Status: SHIPPED | OUTPATIENT
Start: 2025-07-23 | End: 2025-07-25 | Stop reason: SDUPTHER

## 2025-07-23 NOTE — TELEPHONE ENCOUNTER
Rx Refill Note  Requested Prescriptions     Signed Prescriptions Disp Refills    busPIRone (BUSPAR) 7.5 MG tablet 60 tablet 0     Sig: TAKE 1 TABLET BY MOUTH TWICE DAILY     Authorizing Provider: SHUKRI ERICKSON     Ordering User: AUREA LIM      Last office visit with prescribing clinician: 4/4/2025   Last telemedicine visit with prescribing clinician: Visit date not found   Next office visit with prescribing clinician: 7/25/2025       No refills until follow up       Aurea Lim MA  07/23/25, 07:55 CDT

## 2025-07-25 ENCOUNTER — OFFICE VISIT (OUTPATIENT)
Dept: FAMILY MEDICINE CLINIC | Facility: CLINIC | Age: 40
End: 2025-07-25
Payer: COMMERCIAL

## 2025-07-25 VITALS
HEART RATE: 73 BPM | SYSTOLIC BLOOD PRESSURE: 130 MMHG | DIASTOLIC BLOOD PRESSURE: 88 MMHG | TEMPERATURE: 97.5 F | WEIGHT: 221 LBS | OXYGEN SATURATION: 98 % | RESPIRATION RATE: 12 BRPM | BODY MASS INDEX: 30.94 KG/M2 | HEIGHT: 71 IN

## 2025-07-25 DIAGNOSIS — E66.9 OBESITY (BMI 30-39.9): ICD-10-CM

## 2025-07-25 DIAGNOSIS — F17.200 TOBACCO DEPENDENCE: Primary | ICD-10-CM

## 2025-07-25 DIAGNOSIS — L65.9 ALOPECIA: ICD-10-CM

## 2025-07-25 DIAGNOSIS — R44.0 AUDITORY HALLUCINATIONS: ICD-10-CM

## 2025-07-25 DIAGNOSIS — F32.A ANXIETY AND DEPRESSION: ICD-10-CM

## 2025-07-25 DIAGNOSIS — M43.10 RETROLISTHESIS OF VERTEBRAE: ICD-10-CM

## 2025-07-25 DIAGNOSIS — F41.9 ANXIETY AND DEPRESSION: ICD-10-CM

## 2025-07-25 RX ORDER — GABAPENTIN 800 MG/1
800 TABLET ORAL 3 TIMES DAILY PRN
Qty: 90 TABLET | Refills: 2 | Status: SHIPPED | OUTPATIENT
Start: 2025-07-25

## 2025-07-25 RX ORDER — NICOTINE 21 MG/24HR
1 PATCH, TRANSDERMAL 24 HOURS TRANSDERMAL EVERY 24 HOURS
Qty: 28 EACH | Refills: 1 | Status: SHIPPED | OUTPATIENT
Start: 2025-07-25

## 2025-07-25 RX ORDER — HYDROXYZINE HYDROCHLORIDE 25 MG/1
25 TABLET, FILM COATED ORAL 3 TIMES DAILY PRN
Qty: 90 TABLET | Refills: 3 | Status: SHIPPED | OUTPATIENT
Start: 2025-07-25

## 2025-07-25 RX ORDER — BUSPIRONE HYDROCHLORIDE 7.5 MG/1
7.5 TABLET ORAL 2 TIMES DAILY
Qty: 60 TABLET | Refills: 3 | Status: SHIPPED | OUTPATIENT
Start: 2025-07-25

## 2025-07-25 RX ORDER — LUMATEPERONE 42 MG/1
1 CAPSULE ORAL DAILY
Qty: 90 CAPSULE | Refills: 3 | Status: SHIPPED | OUTPATIENT
Start: 2025-07-25

## 2025-07-25 NOTE — PROGRESS NOTES
"       Chief Complaint  medications and Nicotine Dependence    Subjective        Jose Ortiz presents to [facility]    HPI    History of Present Illness  The patient presents for medication refills, cancer screening, nicotine dependence, and hair loss.    He is seeking refills for his medications, including gabapentin, which is nearing its last refill. He reports that his current medications are effective.    He has expressed interest in undergoing cancer screening due to a family history of stage IV small cell lymphoma in both parents. He wishes to monitor his health closely.    He is also considering quitting chewing tobacco and has requested a prescription for a high-dose nicotine patch to aid in this process. He does not smoke cigarettes. He has previously used nicotine patches successfully but resumed chewing tobacco afterward.    Additionally, he has noticed thinning hair at the center of his scalp and a slight recession of his hairline. He is unsure if this could be stress-related and is exploring potential treatments.    Tobacco: The patient chews tobacco.    FAMILY HISTORY  His mother and father both had stage IV small cell lymphoma.    Past Medical History:   Diagnosis Date    Low testosterone      Past Surgical History:   Procedure Laterality Date    TONSILLECTOMY       Social History     Socioeconomic History    Marital status: Single   Tobacco Use    Smoking status: Never    Smokeless tobacco: Former     Types: Chew   Vaping Use    Vaping status: Never Used   Substance and Sexual Activity    Alcohol use: Never    Drug use: Not Currently    Sexual activity: Yes     Partners: Female       Objective   Vital Signs:  /88   Pulse 73   Temp 97.5 °F (36.4 °C) (Temporal)   Resp 12   Ht 180.3 cm (70.98\")   Wt 100 kg (221 lb)   SpO2 98%   BMI 30.84 kg/m²   Estimated body mass index is 30.84 kg/m² as calculated from the following:    Height as of this encounter: " "180.3 cm (70.98\").    Weight as of this encounter: 100 kg (221 lb).              Physical Exam  Vitals reviewed.   Constitutional:       Appearance: Normal appearance.   HENT:      Head: Normocephalic.      Nose: Nose normal.      Mouth/Throat:      Mouth: Mucous membranes are moist.   Eyes:      Extraocular Movements: Extraocular movements intact.   Cardiovascular:      Rate and Rhythm: Normal rate and regular rhythm.      Heart sounds: Normal heart sounds.   Pulmonary:      Effort: Pulmonary effort is normal.      Breath sounds: Normal breath sounds.   Musculoskeletal:         General: Normal range of motion.      Cervical back: Normal range of motion.   Skin:     General: Skin is warm and dry.   Neurological:      General: No focal deficit present.      Mental Status: He is alert and oriented to person, place, and time.   Psychiatric:         Mood and Affect: Mood normal.         Behavior: Behavior normal.        Physical Exam  Other: Blood pressure is normal.    Result Review :        Results               Assessment and Plan   Diagnoses and all orders for this visit:    1. Tobacco dependence (Primary)  -     nicotine (NICODERM CQ) 21 MG/24HR patch; Place 1 patch on the skin as directed by provider Daily.  Dispense: 28 each; Refill: 1  -     nicotine (NICODERM CQ) 14 MG/24HR patch; Place 1 patch on the skin as directed by provider Daily.  Dispense: 28 each; Refill: 1  -     nicotine (NICODERM CQ) 7 MG/24HR patch; Place 1 patch on the skin as directed by provider Daily.  Dispense: 28 each; Refill: 1    2. Anxiety and depression  -     busPIRone (BUSPAR) 7.5 MG tablet; Take 1 tablet by mouth 2 (Two) Times a Day.  Dispense: 60 tablet; Refill: 3  -     hydrOXYzine (ATARAX) 25 MG tablet; Take 1 tablet by mouth 3 (Three) Times a Day As Needed for Itching.  Dispense: 90 tablet; Refill: 3  -     CBC w AUTO Differential; Future    3. Auditory hallucinations  -     Lumateperone Tosylate (Caplyta) 42 MG capsule; Take 1 " capsule by mouth Daily.  Dispense: 90 capsule; Refill: 3    4. Retrolisthesis of vertebrae  -     gabapentin (Neurontin) 800 MG tablet; Take 1 tablet by mouth 3 (Three) Times a Day As Needed (sciatic pain).  Dispense: 90 tablet; Refill: 2    5. Alopecia  - Discussed treatment options.  Recommend he start with topical 5% over-the-counter minoxidil.  Discussed with any treatment may take 6 to 12 months to notice significant results.  May also consider finasteride therapy.    6. Obesity (BMI 30-39.9)  -     TSH Rfx On Abnormal To Free T4; Future  -     Comprehensive metabolic panel; Future  -     Hemoglobin A1c; Future  -     Lipid panel; Future    Refill chronic medicines. Obtain labs for annual physical prior to next appt in 3 mo         EMR Dragon/Transcription disclaimer:   Part of this note may be an electronic transcription/translation of spoken language to printed text using the Dragon Dictation System     Follow Up   Return in about 3 months (around 10/25/2025) for Annual physical.    Patient or patient representative verbalized consent for the use of Ambient Listening during the visit with  Mert Ochoa MD for chart documentation. 7/25/2025  11:00 CDT    Patient was given instructions and counseling regarding his condition or for health maintenance advice. Please see specific information pulled into the AVS if appropriate.